# Patient Record
Sex: FEMALE | Race: WHITE | URBAN - NONMETROPOLITAN AREA
[De-identification: names, ages, dates, MRNs, and addresses within clinical notes are randomized per-mention and may not be internally consistent; named-entity substitution may affect disease eponyms.]

---

## 2020-12-15 ENCOUNTER — NEW PATIENT (OUTPATIENT)
Dept: URBAN - NONMETROPOLITAN AREA CLINIC 13 | Facility: CLINIC | Age: 75
End: 2020-12-15
Payer: MEDICARE

## 2020-12-15 DIAGNOSIS — H25.811 COMBINED FORMS OF AGE-RELATED CATARACT, RIGHT EYE: ICD-10-CM

## 2020-12-15 PROCEDURE — 92004 COMPRE OPH EXAM NEW PT 1/>: CPT | Performed by: OPHTHALMOLOGY

## 2020-12-15 RX ORDER — OFLOXACIN 3 MG/ML
0.3 % SOLUTION/ DROPS OPHTHALMIC
Qty: 5 | Refills: 1 | Status: INACTIVE
Start: 2020-12-15 | End: 2020-12-23

## 2020-12-15 RX ORDER — PREDNISOLONE ACETATE 10 MG/ML
1 % SUSPENSION/ DROPS OPHTHALMIC
Qty: 10 | Refills: 1 | Status: INACTIVE
Start: 2020-12-15 | End: 2021-01-18

## 2020-12-15 ASSESSMENT — INTRAOCULAR PRESSURE
OS: 13
OD: 12

## 2020-12-15 ASSESSMENT — VISUAL ACUITY
OS: 20/30
OD: 20/30

## 2020-12-30 ENCOUNTER — NEW PATIENT (OUTPATIENT)
Dept: URBAN - NONMETROPOLITAN AREA CLINIC 13 | Facility: CLINIC | Age: 75
End: 2020-12-30
Payer: MEDICARE

## 2020-12-30 DIAGNOSIS — H25.813 COMBINED FORMS OF AGE-RELATED CATARACT, BILATERAL: Primary | ICD-10-CM

## 2020-12-30 DIAGNOSIS — Z01.818 ENCOUNTER FOR OTHER PREPROCEDURAL EXAMINATION: Primary | ICD-10-CM

## 2020-12-30 PROCEDURE — 99202 OFFICE O/P NEW SF 15 MIN: CPT | Performed by: PHYSICIAN ASSISTANT

## 2021-01-21 ENCOUNTER — SURGERY (OUTPATIENT)
Dept: URBAN - NONMETROPOLITAN AREA SURGERY 4 | Facility: SURGERY | Age: 76
End: 2021-01-21
Payer: COMMERCIAL

## 2021-01-21 PROCEDURE — 66984 XCAPSL CTRC RMVL W/O ECP: CPT | Performed by: OPHTHALMOLOGY

## 2021-01-22 ENCOUNTER — POST OP (OUTPATIENT)
Dept: URBAN - NONMETROPOLITAN AREA CLINIC 13 | Facility: CLINIC | Age: 76
End: 2021-01-22

## 2021-01-22 PROCEDURE — 99024 POSTOP FOLLOW-UP VISIT: CPT | Performed by: OPTOMETRIST

## 2021-01-22 RX ORDER — PREDNISOLONE ACETATE 10 MG/ML
1 % SUSPENSION/ DROPS OPHTHALMIC
Qty: 10 | Refills: 1 | Status: ACTIVE
Start: 2021-01-22

## 2021-01-22 RX ORDER — OFLOXACIN 3 MG/ML
0.3 % SOLUTION/ DROPS OPHTHALMIC
Qty: 5 | Refills: 1 | Status: ACTIVE
Start: 2021-01-22

## 2021-01-22 ASSESSMENT — INTRAOCULAR PRESSURE
OD: 16
OS: 12

## 2021-01-29 ENCOUNTER — POST OP (OUTPATIENT)
Dept: URBAN - NONMETROPOLITAN AREA CLINIC 13 | Facility: CLINIC | Age: 76
End: 2021-01-29

## 2021-01-29 PROCEDURE — 99024 POSTOP FOLLOW-UP VISIT: CPT | Performed by: OPTOMETRIST

## 2021-01-29 ASSESSMENT — VISUAL ACUITY
OS: 20/40
OD: 20/30

## 2021-01-29 ASSESSMENT — INTRAOCULAR PRESSURE
OS: 12
OD: 17

## 2021-02-04 ENCOUNTER — SURGERY (OUTPATIENT)
Dept: URBAN - NONMETROPOLITAN AREA SURGERY 4 | Facility: SURGERY | Age: 76
End: 2021-02-04
Payer: COMMERCIAL

## 2021-02-04 PROCEDURE — 66984 XCAPSL CTRC RMVL W/O ECP: CPT | Performed by: OPHTHALMOLOGY

## 2021-02-05 ENCOUNTER — POST OP (OUTPATIENT)
Dept: URBAN - NONMETROPOLITAN AREA CLINIC 13 | Facility: CLINIC | Age: 76
End: 2021-02-05

## 2021-02-05 PROCEDURE — 99024 POSTOP FOLLOW-UP VISIT: CPT | Performed by: OPTOMETRIST

## 2021-02-05 RX ORDER — TOBRAMYCIN 3 MG/ML
0.3 % SOLUTION/ DROPS OPHTHALMIC
Qty: 5 | Refills: 0 | Status: ACTIVE
Start: 2021-02-05

## 2021-02-05 ASSESSMENT — INTRAOCULAR PRESSURE
OS: 13
OD: 16

## 2021-02-10 ENCOUNTER — POST OP (OUTPATIENT)
Dept: URBAN - NONMETROPOLITAN AREA CLINIC 13 | Facility: CLINIC | Age: 76
End: 2021-02-10

## 2021-02-10 PROCEDURE — 99024 POSTOP FOLLOW-UP VISIT: CPT | Performed by: OPTOMETRIST

## 2021-02-10 ASSESSMENT — VISUAL ACUITY
OD: 20/20
OS: 20/20

## 2021-02-10 ASSESSMENT — INTRAOCULAR PRESSURE
OS: 13
OD: 13

## 2021-03-10 ENCOUNTER — POST OP (OUTPATIENT)
Dept: URBAN - NONMETROPOLITAN AREA CLINIC 13 | Facility: CLINIC | Age: 76
End: 2021-03-10

## 2021-03-10 PROCEDURE — 99024 POSTOP FOLLOW-UP VISIT: CPT | Performed by: OPTOMETRIST

## 2021-03-10 ASSESSMENT — VISUAL ACUITY
OD: 20/20
OS: 20/20

## 2021-03-10 ASSESSMENT — INTRAOCULAR PRESSURE
OD: 7
OS: 5

## 2021-04-23 ENCOUNTER — POST-OPERATIVE VISIT (OUTPATIENT)
Dept: URBAN - NONMETROPOLITAN AREA CLINIC 13 | Facility: CLINIC | Age: 76
End: 2021-04-23
Payer: COMMERCIAL

## 2021-04-23 DIAGNOSIS — H04.123 DRY EYE SYNDROME OF BILATERAL LACRIMAL GLANDS: Primary | ICD-10-CM

## 2021-04-23 PROCEDURE — 99213 OFFICE O/P EST LOW 20 MIN: CPT | Performed by: OPTOMETRIST

## 2021-04-23 ASSESSMENT — INTRAOCULAR PRESSURE
OD: 8
OS: 8

## 2021-04-23 NOTE — IMPRESSION/PLAN
Impression: Dry eye syndrome of bilateral lacrimal glands: H04.123.  Plan: pf tears w/ soothe xp, and maxitrol gtts 2-3x daily w/ systane gel at night, cool compresses, monitor x 2 weeks w/ ant seg exam and IOP check OU

## 2021-05-05 ENCOUNTER — OFFICE VISIT (OUTPATIENT)
Dept: URBAN - NONMETROPOLITAN AREA CLINIC 13 | Facility: CLINIC | Age: 76
End: 2021-05-05
Payer: COMMERCIAL

## 2021-05-05 PROCEDURE — 99212 OFFICE O/P EST SF 10 MIN: CPT | Performed by: OPTOMETRIST

## 2021-05-05 RX ORDER — NEOMYCIN SULFATE, POLYMYXIN B SULFATE AND DEXAMETHASONE 3.5; 10000; 1 MG/ML; [USP'U]/ML; MG/ML
SUSPENSION OPHTHALMIC
Qty: 5 | Refills: 0 | Status: INACTIVE
Start: 2021-05-05 | End: 2021-06-04

## 2021-05-05 ASSESSMENT — INTRAOCULAR PRESSURE
OD: 9
OS: 9

## 2021-05-05 NOTE — IMPRESSION/PLAN
Impression: Dry eye syndrome of bilateral lacrimal glands: H04.123. Plan: cpm: pf tears every 2 hours, systane gel at night, can d/c cool compresses but continue w/ warm and lid massage and taper off maxitrol bid OU x 1 week then qam OU x 1 week the every other day x 1 week, issue is improving, still has chronic surface issues, did have some diplpia, but does have eso posture but no evidence of eom limitation today, will monitor for now.

## 2021-06-04 ENCOUNTER — OFFICE VISIT (OUTPATIENT)
Dept: URBAN - NONMETROPOLITAN AREA CLINIC 13 | Facility: CLINIC | Age: 76
End: 2021-06-04
Payer: COMMERCIAL

## 2021-06-04 PROCEDURE — 99213 OFFICE O/P EST LOW 20 MIN: CPT | Performed by: OPTOMETRIST

## 2021-06-04 RX ORDER — NEOMYCIN SULFATE, POLYMYXIN B SULFATE AND DEXAMETHASONE 3.5; 10000; 1 MG/ML; [USP'U]/ML; MG/ML
SUSPENSION OPHTHALMIC
Qty: 5 | Refills: 0 | Status: ACTIVE
Start: 2021-06-04

## 2021-06-04 ASSESSMENT — INTRAOCULAR PRESSURE
OS: 15
OD: 15

## 2021-06-04 NOTE — IMPRESSION/PLAN
Impression: Dry eye syndrome of bilateral lacrimal glands: H04.123. Plan: cpm: pf tears every 2 hours, systane gel at night, pt needs to use maxitrol w/ exacerbation's, renewed rx today, pt ed on s/s of worsening issue need to rtc asap. otherwise f/up as scheduled.

## 2021-08-27 ENCOUNTER — OFFICE VISIT (OUTPATIENT)
Dept: URBAN - NONMETROPOLITAN AREA CLINIC 13 | Facility: CLINIC | Age: 76
End: 2021-08-27
Payer: COMMERCIAL

## 2021-08-27 DIAGNOSIS — H52.13 MYOPIA, BILATERAL: ICD-10-CM

## 2021-08-27 DIAGNOSIS — Z96.1 PRESENCE OF INTRAOCULAR LENS: ICD-10-CM

## 2021-08-27 PROCEDURE — 99213 OFFICE O/P EST LOW 20 MIN: CPT | Performed by: OPTOMETRIST

## 2021-08-27 PROCEDURE — 92015 DETERMINE REFRACTIVE STATE: CPT | Performed by: OPTOMETRIST

## 2021-08-27 ASSESSMENT — INTRAOCULAR PRESSURE
OD: 16
OS: 16

## 2021-08-27 NOTE — IMPRESSION/PLAN
Impression: Dry eye syndrome of bilateral lacrimal glands: H04.123. Plan: Tears daily OU. pt ed on s/s of infection need to rtc asap, still needs pf tears, warm compresses and increase use of maxitrol OU.

## 2021-08-27 NOTE — IMPRESSION/PLAN
Impression: Other secondary cataract, bilateral: H26.493. Plan: seems to be affecting vision as well. OD>OS, needs monitor.

## 2021-08-27 NOTE — IMPRESSION/PLAN
Impression: Myopia, bilateral: H52.13. Plan: may need prism rx. will this help vision. pt has lots of strain and headaches and states even triplopia OD, does have eso posture, ? if prism will help, pt to patch and compare, will have pt consult w/ surgeon as does not want yag laser and prefers to avoid surgery but may help vision as checking rx did not really improve vision at all, consult w/ surgeon and if he feels like avoid laser and patching helps consider today's rx w/ prism in glasses but showed may lead to full time wear of glasses and double w/o.

## 2021-09-17 ENCOUNTER — OFFICE VISIT (OUTPATIENT)
Dept: URBAN - NONMETROPOLITAN AREA CLINIC 13 | Facility: CLINIC | Age: 76
End: 2021-09-17
Payer: COMMERCIAL

## 2021-09-17 DIAGNOSIS — H26.493 OTHER SECONDARY CATARACT, BILATERAL: ICD-10-CM

## 2021-09-17 PROCEDURE — 99213 OFFICE O/P EST LOW 20 MIN: CPT | Performed by: OPTOMETRIST

## 2021-09-17 PROCEDURE — 92012 INTRM OPH EXAM EST PATIENT: CPT | Performed by: OPTOMETRIST

## 2021-09-17 ASSESSMENT — INTRAOCULAR PRESSURE
OS: 13
OD: 15

## 2021-09-17 NOTE — IMPRESSION/PLAN
Impression: Other secondary cataract, bilateral: H26.493. Plan: monitor may need yag laser but pt may defer 2' to will try glasses w/ prism use.

## 2021-09-17 NOTE — IMPRESSION/PLAN
Impression: Dry eye syndrome of bilateral lacrimal glands: H04.123. Plan: Tears daily OU. pt ed on s/s of infection need to rtc asap, still needs pf tears, warm compresses and increase use of maxitrol OU as directed.

## 2022-05-17 ENCOUNTER — OFFICE VISIT (OUTPATIENT)
Dept: URBAN - METROPOLITAN AREA CLINIC 16 | Facility: CLINIC | Age: 77
End: 2022-05-17
Payer: COMMERCIAL

## 2022-05-17 DIAGNOSIS — E11.9 DIABETES MELLITUS TYPE 2 WITHOUT MENTION OF COMPLICATION: Primary | ICD-10-CM

## 2022-05-17 DIAGNOSIS — H52.13 MYOPIA, BILATERAL: ICD-10-CM

## 2022-05-17 DIAGNOSIS — H43.813 VITREOUS DEGENERATION, BILATERAL: ICD-10-CM

## 2022-05-17 PROCEDURE — 92004 COMPRE OPH EXAM NEW PT 1/>: CPT | Performed by: OPTOMETRIST

## 2022-05-17 ASSESSMENT — INTRAOCULAR PRESSURE
OD: 16
OS: 16

## 2022-05-17 ASSESSMENT — VISUAL ACUITY
OS: 20/20
OD: 20/25

## 2022-05-17 ASSESSMENT — KERATOMETRY
OD: 43.00
OS: 42.25

## 2022-05-17 NOTE — IMPRESSION/PLAN
Impression: Myopia, bilateral: H52.13. Plan: Refractive error accounts for symptoms. Release SRX. Keep prism in RX. RTC 1 year x CEE.

## 2022-05-17 NOTE — IMPRESSION/PLAN
Impression: Diabetes mellitus Type 2 without mention of complication: Q44.6. Plan: No Non-Proliferative Diabetic Retinopathy, no Diabetic Macular Edema and no Neovascularization of the iris, disc, or elsewhere. OPTOS photos performed and reviewed today, no sign of diabetic macular edema OU. Discussed ocular and systemic benefits of blood sugar control. Send notes to PCP. Check annually. RTC 1 year x CEE.